# Patient Record
Sex: FEMALE | Race: WHITE | NOT HISPANIC OR LATINO
[De-identification: names, ages, dates, MRNs, and addresses within clinical notes are randomized per-mention and may not be internally consistent; named-entity substitution may affect disease eponyms.]

---

## 2022-04-22 ENCOUNTER — APPOINTMENT (OUTPATIENT)
Dept: ORTHOPEDIC SURGERY | Facility: CLINIC | Age: 62
End: 2022-04-22
Payer: MEDICARE

## 2022-04-22 VITALS — WEIGHT: 160 LBS | BODY MASS INDEX: 25.41 KG/M2 | HEIGHT: 66.5 IN

## 2022-04-22 DIAGNOSIS — Z86.59 PERSONAL HISTORY OF OTHER MENTAL AND BEHAVIORAL DISORDERS: ICD-10-CM

## 2022-04-22 DIAGNOSIS — Z86.39 PERSONAL HISTORY OF OTHER ENDOCRINE, NUTRITIONAL AND METABOLIC DISEASE: ICD-10-CM

## 2022-04-22 DIAGNOSIS — M19.90 UNSPECIFIED OSTEOARTHRITIS, UNSPECIFIED SITE: ICD-10-CM

## 2022-04-22 DIAGNOSIS — M25.511 PAIN IN RIGHT SHOULDER: ICD-10-CM

## 2022-04-22 DIAGNOSIS — M19.011 PRIMARY OSTEOARTHRITIS, RIGHT SHOULDER: ICD-10-CM

## 2022-04-22 PROBLEM — Z00.00 ENCOUNTER FOR PREVENTIVE HEALTH EXAMINATION: Status: ACTIVE | Noted: 2022-04-22

## 2022-04-22 PROCEDURE — 73030 X-RAY EXAM OF SHOULDER: CPT | Mod: RT

## 2022-04-22 PROCEDURE — 99204 OFFICE O/P NEW MOD 45 MIN: CPT

## 2022-04-22 PROCEDURE — 99203 OFFICE O/P NEW LOW 30 MIN: CPT

## 2022-04-22 RX ORDER — LEVOTHYROXINE SODIUM 137 UG/1
TABLET ORAL
Refills: 0 | Status: ACTIVE | COMMUNITY

## 2022-04-22 RX ORDER — METHYLPREDNISOLONE 4 MG/1
4 TABLET ORAL
Qty: 1 | Refills: 1 | Status: ACTIVE | COMMUNITY
Start: 2022-04-22 | End: 1900-01-01

## 2022-04-22 RX ORDER — TRAZODONE HYDROCHLORIDE 300 MG/1
TABLET ORAL
Refills: 0 | Status: ACTIVE | COMMUNITY

## 2022-04-22 RX ORDER — ALPRAZOLAM 2 MG/1
TABLET ORAL
Refills: 0 | Status: ACTIVE | COMMUNITY

## 2022-04-22 NOTE — ASSESSMENT
[FreeTextEntry1] : Discussed all option  including steroid inj/by mouth/ and future Replacement \par  medrol dose and Pt Rx \par \par F/U 6 weeks

## 2022-04-22 NOTE — PHYSICAL EXAM
[4 ___] : forward flexion 4[unfilled]/5 [4___] : internal rotation 4[unfilled]/5 [Right] : right shoulder [Glenohumeral arthritis] : Glenohumeral arthritis [] : no scapular winging [FreeTextEntry1] : BONE ON BONE  [TWNoteComboBox7] : active forward flexion 80 degrees [TWNoteComboBox6] : internal rotation L5 [de-identified] : external rotation 40 degrees

## 2022-04-22 NOTE — HISTORY OF PRESENT ILLNESS
[8] : 8 [0] : 0 [Shooting] : shooting [Intermittent] : intermittent [Retired] : Work status: retired [de-identified] : Patient woke up this morning with right shoulder pain.    has seen ortho prior   Cortisone 10 Years ago   Chris and Ashvin \par NSAIDS,   NKI  [] : no [FreeTextEntry1] : Right Shoulder [FreeTextEntry9] : Not moving the arm [de-identified] : Getting dressed, using the bathroom

## 2022-06-03 ENCOUNTER — APPOINTMENT (OUTPATIENT)
Dept: ORTHOPEDIC SURGERY | Facility: CLINIC | Age: 62
End: 2022-06-03

## 2023-06-28 ENCOUNTER — OFFICE (OUTPATIENT)
Dept: URBAN - METROPOLITAN AREA CLINIC 12 | Facility: CLINIC | Age: 63
Setting detail: OPHTHALMOLOGY
End: 2023-06-28
Payer: MEDICARE

## 2023-06-28 ENCOUNTER — RX ONLY (RX ONLY)
Age: 63
End: 2023-06-28

## 2023-06-28 DIAGNOSIS — H01.004: ICD-10-CM

## 2023-06-28 DIAGNOSIS — H43.393: ICD-10-CM

## 2023-06-28 DIAGNOSIS — H35.371: ICD-10-CM

## 2023-06-28 DIAGNOSIS — H01.001: ICD-10-CM

## 2023-06-28 DIAGNOSIS — H16.223: ICD-10-CM

## 2023-06-28 DIAGNOSIS — H25.13: ICD-10-CM

## 2023-06-28 PROCEDURE — 92014 COMPRE OPH EXAM EST PT 1/>: CPT | Performed by: OPHTHALMOLOGY

## 2023-06-28 PROCEDURE — 92134 CPTRZ OPH DX IMG PST SGM RTA: CPT | Performed by: OPHTHALMOLOGY

## 2023-06-28 ASSESSMENT — SPHEQUIV_DERIVED
OD_SPHEQUIV: 1.125
OS_SPHEQUIV: 0.625
OS_SPHEQUIV: 0.625
OD_SPHEQUIV: 1.125

## 2023-06-28 ASSESSMENT — REFRACTION_MANIFEST
OD_SPHERE: +1.25
OD_AXIS: 130
OS_AXIS: 15
OS_SPHERE: +1.00
OD_CYLINDER: -0.25
OS_CYLINDER: -0.75

## 2023-06-28 ASSESSMENT — REFRACTION_CURRENTRX
OD_OVR_VA: 20/
OD_SPHERE: +2.50
OD_VPRISM_DIRECTION: SV
OS_VPRISM_DIRECTION: SV
OS_SPHERE: +2.50
OS_OVR_VA: 20/

## 2023-06-28 ASSESSMENT — AXIALLENGTH_DERIVED
OD_AL: 22.9424
OD_AL: 22.9424
OS_AL: 22.4847
OS_AL: 22.4847

## 2023-06-28 ASSESSMENT — REFRACTION_AUTOREFRACTION
OD_AXIS: 129
OD_SPHERE: +1.25
OS_CYLINDER: -0.75
OS_SPHERE: +1.00
OS_AXIS: 16
OD_CYLINDER: -0.25

## 2023-06-28 ASSESSMENT — VISUAL ACUITY
OS_BCVA: 20/30-2
OD_BCVA: 20/30-2

## 2023-06-28 ASSESSMENT — KERATOMETRY
OS_AXISANGLE_DEGREES: 102
OS_K2POWER_DIOPTERS: 46.75
OD_K2POWER_DIOPTERS: 46.00
OD_K1POWER_DIOPTERS: 42.25
OD_AXISANGLE_DEGREES: 83
OS_K1POWER_DIOPTERS: 45.25

## 2023-06-28 ASSESSMENT — LID EXAM ASSESSMENTS
OS_BLEPHARITIS: 1+
OD_BLEPHARITIS: 1+

## 2023-06-28 ASSESSMENT — SUPERFICIAL PUNCTATE KERATITIS (SPK)
OS_SPK: T
OD_SPK: T

## 2023-06-28 ASSESSMENT — CONFRONTATIONAL VISUAL FIELD TEST (CVF)
OD_FINDINGS: FULL
OS_FINDINGS: FULL

## 2023-06-28 ASSESSMENT — TONOMETRY: OD_IOP_MMHG: 13

## 2023-12-13 ENCOUNTER — OFFICE (OUTPATIENT)
Dept: URBAN - METROPOLITAN AREA CLINIC 12 | Facility: CLINIC | Age: 63
Setting detail: OPHTHALMOLOGY
End: 2023-12-13
Payer: MEDICARE

## 2023-12-13 DIAGNOSIS — H01.004: ICD-10-CM

## 2023-12-13 DIAGNOSIS — H43.393: ICD-10-CM

## 2023-12-13 DIAGNOSIS — H16.223: ICD-10-CM

## 2023-12-13 DIAGNOSIS — H35.371: ICD-10-CM

## 2023-12-13 DIAGNOSIS — H25.13: ICD-10-CM

## 2023-12-13 DIAGNOSIS — H01.001: ICD-10-CM

## 2023-12-13 PROCEDURE — 99213 OFFICE O/P EST LOW 20 MIN: CPT | Performed by: OPHTHALMOLOGY

## 2023-12-13 ASSESSMENT — SPHEQUIV_DERIVED
OD_SPHEQUIV: 0.875
OS_SPHEQUIV: 0.5
OD_SPHEQUIV: 0.875
OS_SPHEQUIV: 0.5

## 2023-12-13 ASSESSMENT — REFRACTION_CURRENTRX
OS_OVR_VA: 20/
OS_SPHERE: +2.50
OS_VPRISM_DIRECTION: SV
OD_SPHERE: +2.50
OD_OVR_VA: 20/
OD_VPRISM_DIRECTION: SV

## 2023-12-13 ASSESSMENT — REFRACTION_AUTOREFRACTION
OD_CYLINDER: -0.25
OD_AXIS: 164
OD_SPHERE: +1.00
OS_CYLINDER: -1.00
OS_AXIS: 012
OS_SPHERE: +1.00

## 2023-12-13 ASSESSMENT — REFRACTION_MANIFEST
OS_SPHERE: +1.00
OS_VA1: 20/20
OD_SPHERE: +1.00
OS_AXIS: 012
OD_AXIS: 164
OD_CYLINDER: -0.25
OD_VA1: 20/20-
OS_CYLINDER: -1.00

## 2023-12-13 ASSESSMENT — CONFRONTATIONAL VISUAL FIELD TEST (CVF)
OS_FINDINGS: FULL
OD_FINDINGS: FULL

## 2023-12-13 ASSESSMENT — SUPERFICIAL PUNCTATE KERATITIS (SPK)
OS_SPK: T
OD_SPK: T

## 2023-12-13 ASSESSMENT — LID EXAM ASSESSMENTS
OD_BLEPHARITIS: 1+
OS_BLEPHARITIS: 1+

## 2024-12-16 ENCOUNTER — APPOINTMENT (OUTPATIENT)
Dept: ORTHOPEDIC SURGERY | Facility: CLINIC | Age: 64
End: 2024-12-16
Payer: MEDICARE

## 2024-12-16 VITALS — HEIGHT: 66.5 IN | WEIGHT: 160 LBS | BODY MASS INDEX: 25.41 KG/M2

## 2024-12-16 DIAGNOSIS — M47.816 SPONDYLOSIS W/OUT MYELOPATHY OR RADICULOPATHY, LUMBAR REGION: ICD-10-CM

## 2024-12-16 DIAGNOSIS — M41.50 OTHER SECONDARY SCOLIOSIS, SITE UNSPECIFIED: ICD-10-CM

## 2024-12-16 DIAGNOSIS — R29.818 OTHER SYMPTOMS AND SIGNS INVOLVING THE NERVOUS SYSTEM: ICD-10-CM

## 2024-12-16 PROCEDURE — 99213 OFFICE O/P EST LOW 20 MIN: CPT

## 2024-12-16 PROCEDURE — 72100 X-RAY EXAM L-S SPINE 2/3 VWS: CPT

## 2024-12-16 PROCEDURE — 99203 OFFICE O/P NEW LOW 30 MIN: CPT

## 2024-12-18 ENCOUNTER — OFFICE (OUTPATIENT)
Dept: URBAN - METROPOLITAN AREA CLINIC 12 | Facility: CLINIC | Age: 64
Setting detail: OPHTHALMOLOGY
End: 2024-12-18
Payer: MEDICARE

## 2024-12-18 DIAGNOSIS — H25.13: ICD-10-CM

## 2024-12-18 DIAGNOSIS — H01.001: ICD-10-CM

## 2024-12-18 DIAGNOSIS — H16.223: ICD-10-CM

## 2024-12-18 DIAGNOSIS — H43.393: ICD-10-CM

## 2024-12-18 DIAGNOSIS — H01.004: ICD-10-CM

## 2024-12-18 DIAGNOSIS — H25.12: ICD-10-CM

## 2024-12-18 DIAGNOSIS — H35.373: ICD-10-CM

## 2024-12-18 DIAGNOSIS — H25.11: ICD-10-CM

## 2024-12-18 PROCEDURE — 92250 FUNDUS PHOTOGRAPHY W/I&R: CPT | Performed by: OPHTHALMOLOGY

## 2024-12-18 PROCEDURE — 92014 COMPRE OPH EXAM EST PT 1/>: CPT | Performed by: OPHTHALMOLOGY

## 2024-12-18 ASSESSMENT — KERATOMETRY
OS_K1POWER_DIOPTERS: 45.00
OD_K1POWER_DIOPTERS: 45.00
OD_K2POWER_DIOPTERS: 46.25
OD_AXISANGLE_DEGREES: 078
OS_K2POWER_DIOPTERS: 46.25
METHOD_AUTO_MANUAL: AUTO
OS_AXISANGLE_DEGREES: 124

## 2024-12-18 ASSESSMENT — REFRACTION_CURRENTRX
OS_OVR_VA: 20/
OD_SPHERE: +2.50
OD_VPRISM_DIRECTION: SV
OS_SPHERE: +2.50
OD_OVR_VA: 20/
OS_VPRISM_DIRECTION: SV

## 2024-12-18 ASSESSMENT — REFRACTION_MANIFEST
OD_SPHERE: +0.75
OS_VA1: 20/25+
OS_AXIS: 025
OD_VA1: 20/25
OD_AXIS: 179
OS_SPHERE: +1.25
OS_CYLINDER: -0.50
OD_CYLINDER: -0.25

## 2024-12-18 ASSESSMENT — LID EXAM ASSESSMENTS
OS_BLEPHARITIS: 1+
OD_BLEPHARITIS: 1+

## 2024-12-18 ASSESSMENT — CONFRONTATIONAL VISUAL FIELD TEST (CVF)
OD_FINDINGS: FULL
OS_FINDINGS: FULL

## 2024-12-18 ASSESSMENT — REFRACTION_AUTOREFRACTION
OD_SPHERE: +1.00
OD_AXIS: 179
OS_SPHERE: +1.25
OD_CYLINDER: -0.25
OS_CYLINDER: -0.50
OS_AXIS: 025

## 2024-12-18 ASSESSMENT — VISUAL ACUITY
OS_BCVA: 20/25
OD_BCVA: 20/40-

## 2024-12-18 ASSESSMENT — SUPERFICIAL PUNCTATE KERATITIS (SPK)
OS_SPK: T
OD_SPK: T

## 2024-12-18 ASSESSMENT — TONOMETRY
OD_IOP_MMHG: 12
OS_IOP_MMHG: 11

## 2024-12-23 ENCOUNTER — RESULT REVIEW (OUTPATIENT)
Age: 64
End: 2024-12-23

## 2024-12-30 ENCOUNTER — APPOINTMENT (OUTPATIENT)
Dept: ORTHOPEDIC SURGERY | Facility: CLINIC | Age: 64
End: 2024-12-30
Payer: MEDICARE

## 2024-12-30 VITALS — BODY MASS INDEX: 25.41 KG/M2 | WEIGHT: 160 LBS | HEIGHT: 66.5 IN

## 2024-12-30 DIAGNOSIS — M51.26 OTHER INTERVERTEBRAL DISC DISPLACEMENT, LUMBAR REGION: ICD-10-CM

## 2024-12-30 DIAGNOSIS — M41.50 OTHER SECONDARY SCOLIOSIS, SITE UNSPECIFIED: ICD-10-CM

## 2024-12-30 DIAGNOSIS — M47.816 SPONDYLOSIS W/OUT MYELOPATHY OR RADICULOPATHY, LUMBAR REGION: ICD-10-CM

## 2024-12-30 PROCEDURE — 99214 OFFICE O/P EST MOD 30 MIN: CPT

## 2025-02-24 ENCOUNTER — APPOINTMENT (OUTPATIENT)
Dept: ORTHOPEDIC SURGERY | Facility: CLINIC | Age: 65
End: 2025-02-24
Payer: MEDICARE

## 2025-02-24 VITALS — WEIGHT: 160 LBS | BODY MASS INDEX: 25.41 KG/M2 | HEIGHT: 66.5 IN

## 2025-02-24 DIAGNOSIS — M41.50 OTHER SECONDARY SCOLIOSIS, SITE UNSPECIFIED: ICD-10-CM

## 2025-02-24 DIAGNOSIS — M47.816 SPONDYLOSIS W/OUT MYELOPATHY OR RADICULOPATHY, LUMBAR REGION: ICD-10-CM

## 2025-02-24 PROCEDURE — 99213 OFFICE O/P EST LOW 20 MIN: CPT

## 2025-06-30 ENCOUNTER — OFFICE (OUTPATIENT)
Dept: URBAN - METROPOLITAN AREA CLINIC 12 | Facility: CLINIC | Age: 65
Setting detail: OPHTHALMOLOGY
End: 2025-06-30
Payer: MEDICARE

## 2025-06-30 DIAGNOSIS — H25.12: ICD-10-CM

## 2025-06-30 DIAGNOSIS — H43.393: ICD-10-CM

## 2025-06-30 DIAGNOSIS — H25.13: ICD-10-CM

## 2025-06-30 DIAGNOSIS — H16.223: ICD-10-CM

## 2025-06-30 DIAGNOSIS — H01.004: ICD-10-CM

## 2025-06-30 DIAGNOSIS — H35.373: ICD-10-CM

## 2025-06-30 DIAGNOSIS — H01.001: ICD-10-CM

## 2025-06-30 DIAGNOSIS — H25.11: ICD-10-CM

## 2025-06-30 PROCEDURE — 92014 COMPRE OPH EXAM EST PT 1/>: CPT | Performed by: OPHTHALMOLOGY

## 2025-06-30 PROCEDURE — 92250 FUNDUS PHOTOGRAPHY W/I&R: CPT | Performed by: OPHTHALMOLOGY

## 2025-06-30 ASSESSMENT — REFRACTION_CURRENTRX
OD_SPHERE: +2.50
OD_OVR_VA: 20/
OD_VPRISM_DIRECTION: SV
OS_VPRISM_DIRECTION: SV
OS_SPHERE: +2.50
OS_OVR_VA: 20/

## 2025-06-30 ASSESSMENT — REFRACTION_MANIFEST
OD_AXIS: 018
OD_SPHERE: +1.00
OS_AXIS: 025
OS_SPHERE: +1.25
OS_SPHERE: +1.00
OD_VA1: 20/25-2
OD_VA1: 20/25
OS_VA1: 20/30
OD_CYLINDER: -0.25
OD_SPHERE: +0.75
OS_CYLINDER: -1.00
OS_VA1: 20/25+
OD_AXIS: 179
OS_CYLINDER: -0.50
OD_CYLINDER: -0.75
OS_AXIS: 017

## 2025-06-30 ASSESSMENT — VISUAL ACUITY
OS_BCVA: 20/30-2
OD_BCVA: 20/40-2

## 2025-06-30 ASSESSMENT — TONOMETRY
OS_IOP_MMHG: 13
OD_IOP_MMHG: 16

## 2025-06-30 ASSESSMENT — CONFRONTATIONAL VISUAL FIELD TEST (CVF)
OD_FINDINGS: FULL
OS_FINDINGS: FULL